# Patient Record
(demographics unavailable — no encounter records)

---

## 2025-06-06 NOTE — PHYSICAL EXAM
[No Acute Distress] : no acute distress [Well-Appearing] : well-appearing [Soft] : abdomen soft [Non Tender] : non-tender [Non-distended] : non-distended [Coordination Grossly Intact] : coordination grossly intact [Normal Gait] : normal gait [Normal] : affect was normal and insight and judgment were intact

## 2025-06-06 NOTE — HISTORY OF PRESENT ILLNESS
[de-identified] : ROBIN MADDEN is a 34 year y/o F with PMH of depression and anxiety who presents as a new patient today to establish care. 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 3-5 times per week Tobacco use: never Alcohol use: 1-2 drinks per month Drug use: none Sexually active: not currently, doesn't need STI testing today Mood: good, prescribed by Psychiatrist, als goes to therapy q2w Firearms: N  #Health Maintenance Tdap: thinks UTD 2017 Gardasil: completed Pap: UTD, no h/o abn STI screen: not today Family Planning: OCPs

## 2025-06-06 NOTE — HEALTH RISK ASSESSMENT
[Yes] : Yes [Monthly or less (1 pt)] : Monthly or less (1 point) [1 or 2 (0 pts)] : 1 or 2 (0 points) [Never (0 pts)] : Never (0 points) [0] : 2) Feeling down, depressed, or hopeless: Not at all (0) [PHQ-2 Negative - No further assessment needed] : PHQ-2 Negative - No further assessment needed [Patient reported PAP Smear was normal] : Patient reported PAP Smear was normal [HIV test declined] : HIV test declined [Hepatitis C test offered] : Hepatitis C test offered [Never] : Never [NO] : No [PapSmearComments] : UTD, no h/o abn, follows with Gyn

## 2025-06-06 NOTE — ASSESSMENT
[Vaccines Reviewed] : Immunizations reviewed today. Please see immunization details in the vaccine log within the immunization flowsheet.  [FreeTextEntry1] : #HM Thinks UTD on tdap 2017 UTD on pap, no h/o abn, follows with Gyn Completed gardasil Does not need STI screening today Fasting bloodwork today OCPs